# Patient Record
Sex: FEMALE | Race: WHITE | NOT HISPANIC OR LATINO | Employment: OTHER | URBAN - NONMETROPOLITAN AREA
[De-identification: names, ages, dates, MRNs, and addresses within clinical notes are randomized per-mention and may not be internally consistent; named-entity substitution may affect disease eponyms.]

---

## 2021-02-26 ENCOUNTER — OFFICE VISIT (OUTPATIENT)
Dept: URGENT CARE | Facility: PHYSICIAN GROUP | Age: 76
End: 2021-02-26
Payer: COMMERCIAL

## 2021-02-26 VITALS
TEMPERATURE: 98.5 F | OXYGEN SATURATION: 96 % | HEIGHT: 66 IN | BODY MASS INDEX: 24.2 KG/M2 | SYSTOLIC BLOOD PRESSURE: 136 MMHG | RESPIRATION RATE: 16 BRPM | HEART RATE: 75 BPM | DIASTOLIC BLOOD PRESSURE: 80 MMHG | WEIGHT: 150.6 LBS

## 2021-02-26 DIAGNOSIS — L08.9 WOUND INFECTION: ICD-10-CM

## 2021-02-26 DIAGNOSIS — T14.8XXA WOUND INFECTION: ICD-10-CM

## 2021-02-26 PROCEDURE — 99203 OFFICE O/P NEW LOW 30 MIN: CPT | Performed by: FAMILY MEDICINE

## 2021-02-26 RX ORDER — DORZOLAMIDE HYDROCHLORIDE AND TIMOLOL MALEATE 20; 5 MG/ML; MG/ML
SOLUTION/ DROPS OPHTHALMIC
COMMUNITY

## 2021-02-26 RX ORDER — SPIRONOLACTONE 25 MG/1
25 TABLET ORAL DAILY
COMMUNITY
Start: 2020-09-08

## 2021-02-26 RX ORDER — SULFAMETHOXAZOLE AND TRIMETHOPRIM 800; 160 MG/1; MG/1
1 TABLET ORAL 2 TIMES DAILY
Qty: 10 TABLET | Refills: 0 | Status: SHIPPED | OUTPATIENT
Start: 2021-02-26 | End: 2021-03-03

## 2021-02-26 RX ORDER — AMOXICILLIN 500 MG/1
500 CAPSULE ORAL 3 TIMES DAILY
Qty: 15 CAPSULE | Refills: 0 | Status: SHIPPED | OUTPATIENT
Start: 2021-02-26 | End: 2021-03-03

## 2021-02-26 RX ORDER — PRAVASTATIN SODIUM 10 MG
TABLET ORAL
COMMUNITY
Start: 2021-02-05 | End: 2023-08-06

## 2021-02-26 RX ORDER — MONTELUKAST SODIUM 10 MG/1
TABLET ORAL
COMMUNITY
Start: 2021-02-01

## 2021-02-26 ASSESSMENT — ENCOUNTER SYMPTOMS
EYE REDNESS: 0
MYALGIAS: 0
NAUSEA: 0
EYE DISCHARGE: 0
VOMITING: 0
WEIGHT LOSS: 0

## 2021-02-26 NOTE — PROGRESS NOTES
"Subjective:      Kelly Hurley is a 75 y.o. female who presents with Abrasion ((R) forearm abrasion from a week ago, possible infected )            1 week ago fell into a dresser and cut right forearm.  Over the last 2 days there has been expanding redness and warmth from the site.  Small amount of purulent drainage.  No red streaking.  No fever.  No PMH diabetes or immunocompromise.  No function or sensory loss.  No concern for foreign body.  No other aggravating or alleviating factors.      Review of Systems   Constitutional: Negative for malaise/fatigue and weight loss.   Eyes: Negative for discharge and redness.   Gastrointestinal: Negative for nausea and vomiting.   Musculoskeletal: Negative for joint pain and myalgias.   Skin: Negative for itching and rash.     .  Medications, Allergies, and current problem list reviewed today in Epic       Objective:     /80   Pulse 75   Temp 36.9 °C (98.5 °F)   Resp 16   Ht 1.676 m (5' 6\")   Wt 68.3 kg (150 lb 9.6 oz)   SpO2 96%   BMI 24.31 kg/m²      Physical Exam  Constitutional:       General: She is not in acute distress.     Appearance: She is well-developed.   HENT:      Head: Normocephalic and atraumatic.   Eyes:      Conjunctiva/sclera: Conjunctivae normal.   Cardiovascular:      Rate and Rhythm: Normal rate and regular rhythm.      Heart sounds: Normal heart sounds. No murmur.   Pulmonary:      Effort: Pulmonary effort is normal.      Breath sounds: Normal breath sounds. No wheezing.   Musculoskeletal:        Arms:    Skin:     General: Skin is warm and dry.      Findings: No rash.   Neurological:      Mental Status: She is alert and oriented to person, place, and time.                 Assessment/Plan:         1. Wound infection  amoxicillin (AMOXIL) 500 MG Cap    sulfamethoxazole-trimethoprim (BACTRIM DS) 800-160 MG tablet     Differential diagnosis, natural history, supportive care, and indications for immediate follow-up discussed at length.     Wound " care discussed.

## 2021-03-05 ENCOUNTER — TELEPHONE (OUTPATIENT)
Dept: URGENT CARE | Facility: PHYSICIAN GROUP | Age: 76
End: 2021-03-05

## 2021-03-05 NOTE — TELEPHONE ENCOUNTER
pts daughter called states that they finished the sulfamethoxazole-trimethoprim (BACTRIM DS) and they wanted to know if they are supposed to  the amoxicillin (AMOXIL) and if she needs to take that one too  Please advise

## 2021-03-06 NOTE — TELEPHONE ENCOUNTER
Called and discussed with Tiffany and her daughter Orly.  There continues to be signs of infection.  They will  the amoxicillin and let me know if there is no resolution.

## 2021-05-28 ENCOUNTER — OFFICE VISIT (OUTPATIENT)
Dept: URGENT CARE | Facility: PHYSICIAN GROUP | Age: 76
End: 2021-05-28
Payer: COMMERCIAL

## 2021-05-28 ENCOUNTER — HOSPITAL ENCOUNTER (OUTPATIENT)
Facility: MEDICAL CENTER | Age: 76
End: 2021-05-28
Attending: NURSE PRACTITIONER
Payer: COMMERCIAL

## 2021-05-28 VITALS
HEIGHT: 65 IN | HEART RATE: 77 BPM | RESPIRATION RATE: 12 BRPM | BODY MASS INDEX: 23.82 KG/M2 | SYSTOLIC BLOOD PRESSURE: 140 MMHG | WEIGHT: 143 LBS | OXYGEN SATURATION: 98 % | TEMPERATURE: 97.1 F | DIASTOLIC BLOOD PRESSURE: 90 MMHG

## 2021-05-28 DIAGNOSIS — R30.0 DYSURIA: ICD-10-CM

## 2021-05-28 LAB
APPEARANCE UR: CLEAR
BILIRUB UR STRIP-MCNC: NEGATIVE MG/DL
COLOR UR AUTO: YELLOW
GLUCOSE UR STRIP.AUTO-MCNC: NEGATIVE MG/DL
KETONES UR STRIP.AUTO-MCNC: NORMAL MG/DL
LEUKOCYTE ESTERASE UR QL STRIP.AUTO: NORMAL
NITRITE UR QL STRIP.AUTO: NEGATIVE
PH UR STRIP.AUTO: 7 [PH] (ref 5–8)
PROT UR QL STRIP: NEGATIVE MG/DL
RBC UR QL AUTO: NORMAL
SP GR UR STRIP.AUTO: 1.02
UROBILINOGEN UR STRIP-MCNC: NEGATIVE MG/DL

## 2021-05-28 PROCEDURE — 87660 TRICHOMONAS VAGIN DIR PROBE: CPT

## 2021-05-28 PROCEDURE — 87480 CANDIDA DNA DIR PROBE: CPT

## 2021-05-28 PROCEDURE — 87186 SC STD MICRODIL/AGAR DIL: CPT

## 2021-05-28 PROCEDURE — 87510 GARDNER VAG DNA DIR PROBE: CPT

## 2021-05-28 PROCEDURE — 87086 URINE CULTURE/COLONY COUNT: CPT

## 2021-05-28 PROCEDURE — 81002 URINALYSIS NONAUTO W/O SCOPE: CPT | Performed by: NURSE PRACTITIONER

## 2021-05-28 PROCEDURE — 99214 OFFICE O/P EST MOD 30 MIN: CPT | Performed by: NURSE PRACTITIONER

## 2021-05-28 PROCEDURE — 87077 CULTURE AEROBIC IDENTIFY: CPT

## 2021-05-28 RX ORDER — FLUCONAZOLE 150 MG/1
150 TABLET ORAL ONCE
Qty: 1 TABLET | Refills: 0 | Status: SHIPPED | OUTPATIENT
Start: 2021-05-28 | End: 2021-05-28

## 2021-05-28 RX ORDER — CLOBETASOL PROPIONATE 0.5 MG/G
CREAM TOPICAL
COMMUNITY
Start: 2021-03-18

## 2021-05-28 RX ORDER — LANSOPRAZOLE 30 MG/1
CAPSULE, DELAYED RELEASE ORAL
COMMUNITY
Start: 2021-05-05 | End: 2021-05-28

## 2021-05-28 RX ORDER — TRAZODONE HYDROCHLORIDE 50 MG/1
TABLET ORAL
COMMUNITY
Start: 2021-04-16

## 2021-05-28 RX ORDER — CITALOPRAM 40 MG/1
TABLET ORAL
COMMUNITY
Start: 2021-03-16

## 2021-05-28 RX ORDER — LANSOPRAZOLE 30 MG/1
CAPSULE, DELAYED RELEASE ORAL
COMMUNITY
Start: 2021-02-26

## 2021-05-28 RX ORDER — CEFDINIR 300 MG/1
300 CAPSULE ORAL 2 TIMES DAILY
Qty: 10 CAPSULE | Refills: 0 | Status: SHIPPED | OUTPATIENT
Start: 2021-05-28 | End: 2021-06-02

## 2021-05-28 RX ORDER — LATANOPROST 50 UG/ML
SOLUTION/ DROPS OPHTHALMIC
COMMUNITY
Start: 2021-05-02

## 2021-05-28 RX ORDER — ALBUTEROL SULFATE 90 UG/1
AEROSOL, METERED RESPIRATORY (INHALATION)
COMMUNITY
Start: 2021-03-16

## 2021-05-28 ASSESSMENT — ENCOUNTER SYMPTOMS
HEADACHES: 0
NAUSEA: 0
FLANK PAIN: 0
VOMITING: 0
MYALGIAS: 0

## 2021-05-28 NOTE — PROGRESS NOTES
Subjective:      Kelly Hurley is a 75 y.o. female who presents with No chief complaint on file.    History reviewed. No pertinent past medical history.  Social History     Socioeconomic History   • Marital status:      Spouse name: Not on file   • Number of children: Not on file   • Years of education: Not on file   • Highest education level: Not on file   Occupational History   • Not on file   Tobacco Use   • Smoking status: Never Smoker   • Smokeless tobacco: Never Used   Substance and Sexual Activity   • Alcohol use: Not Currently   • Drug use: Not Currently   • Sexual activity: Not on file   Other Topics Concern   • Not on file   Social History Narrative   • Not on file     Social Determinants of Health     Financial Resource Strain:    • Difficulty of Paying Living Expenses:    Food Insecurity:    • Worried About Running Out of Food in the Last Year:    • Ran Out of Food in the Last Year:    Transportation Needs:    • Lack of Transportation (Medical):    • Lack of Transportation (Non-Medical):    Physical Activity:    • Days of Exercise per Week:    • Minutes of Exercise per Session:    Stress:    • Feeling of Stress :    Social Connections:    • Frequency of Communication with Friends and Family:    • Frequency of Social Gatherings with Friends and Family:    • Attends Muslim Services:    • Active Member of Clubs or Organizations:    • Attends Club or Organization Meetings:    • Marital Status:    Intimate Partner Violence:    • Fear of Current or Ex-Partner:    • Emotionally Abused:    • Physically Abused:    • Sexually Abused:      History reviewed. No pertinent family history.    Allergies: Banana and Eggs    Patient is a 75-year-old female who presents with complaint of dysuria and vaginal itching.  Patient has used Monistat over-the-counter multiple times that she believes she had a yeast infection.  She states minimal improvement but then symptoms seem to return.          Dysuria   This is a  "new problem. The current episode started in the past 7 days. The problem occurs every urination. The problem has been unchanged. The pain is moderate. There has been no fever. Associated symptoms include frequency and urgency. Pertinent negatives include no flank pain, hematuria, nausea or vomiting. She has tried nothing for the symptoms. The treatment provided no relief.       Review of Systems   Gastrointestinal: Negative for nausea and vomiting.   Genitourinary: Positive for dysuria, frequency and urgency. Negative for flank pain and hematuria.   Musculoskeletal: Negative for myalgias.   Neurological: Negative for headaches.   All other systems reviewed and are negative.         Objective:     /90   Pulse 77   Temp 36.2 °C (97.1 °F) (Temporal)   Resp 12   Ht 1.651 m (5' 5\")   Wt 64.9 kg (143 lb)   SpO2 98%   BMI 23.80 kg/m²      Physical Exam  Vitals reviewed.   Constitutional:       Appearance: Normal appearance.   Abdominal:      General: Abdomen is flat.      Tenderness: There is no abdominal tenderness. There is no right CVA tenderness, left CVA tenderness, guarding or rebound.   Skin:     General: Skin is warm and dry.   Neurological:      Mental Status: She is alert.                   UA: trace leukocytes, negative nitrates, trace blood     Assessment/Plan:    dysuria  Cystitis    omnicef  Urine culture  Vaginal pathogens  Push fluids  ER precautions: flank pain, fever >100.5, n/v, flu like symptoms.        There are no diagnoses linked to this encounter.  "

## 2021-05-29 ENCOUNTER — TELEPHONE (OUTPATIENT)
Dept: URGENT CARE | Facility: PHYSICIAN GROUP | Age: 76
End: 2021-05-29

## 2021-05-29 LAB
CANDIDA DNA VAG QL PROBE+SIG AMP: NEGATIVE
G VAGINALIS DNA VAG QL PROBE+SIG AMP: NEGATIVE
T VAGINALIS DNA VAG QL PROBE+SIG AMP: NEGATIVE

## 2021-05-29 NOTE — TELEPHONE ENCOUNTER
Patient notified by phone of results of vaginal pathogens.  Panel is negative at this time.  Advised patient that her urine culture is still pending, and continue medication prescribed.  We will notify her upon receiving results.  Patient verbalized understanding and agreed with plan of care.

## 2021-05-31 LAB
BACTERIA UR CULT: ABNORMAL
BACTERIA UR CULT: ABNORMAL
SIGNIFICANT IND 70042: ABNORMAL
SITE SITE: ABNORMAL
SOURCE SOURCE: ABNORMAL

## 2021-06-01 ENCOUNTER — TELEPHONE (OUTPATIENT)
Dept: URGENT CARE | Facility: CLINIC | Age: 76
End: 2021-06-01

## 2021-06-01 DIAGNOSIS — N30.90 CYSTITIS: ICD-10-CM

## 2021-06-01 RX ORDER — SULFAMETHOXAZOLE AND TRIMETHOPRIM 800; 160 MG/1; MG/1
1 TABLET ORAL 2 TIMES DAILY
Qty: 10 TABLET | Refills: 0 | Status: SHIPPED | OUTPATIENT
Start: 2021-06-01 | End: 2021-06-06

## 2021-06-01 NOTE — TELEPHONE ENCOUNTER
Patient notified by phone of results of urine culture.  Urine is positive for MRSA, unfortunately resistant to the medication that was prescribed at her initial office visit.  Patient does not have any antibiotic allergies that she is aware of.  Prescription for Bactrim DS sent to patient's pharmacy.  I have advised patient to  the medication today and start taking.  Patient verbalized understanding and agreement.  Patient states she is still having symptoms although a slight improvement his symptoms have not completely resolved.  I have advised her the symptoms should completely resolve with the Bactrim.  If not, please come back to urgent care for reevaluation.  No further questions at this time.

## 2022-01-27 ENCOUNTER — TELEPHONE (OUTPATIENT)
Dept: CARDIOLOGY | Facility: MEDICAL CENTER | Age: 77
End: 2022-01-27

## 2022-01-27 NOTE — TELEPHONE ENCOUNTER
LVM for pt in regards to upcoming NP appt with [facility]. [facility] notes are in chart under media including EKG that was done at referring providers office. Need to confirm if this will be first time pt has seen cardio and if any other recent testing has been completed.    Pending call back.

## 2022-01-27 NOTE — TELEPHONE ENCOUNTER
Pt called back stating she has not seen a cardiologist before. Pt had a NM stress test that was done in [facility] in [place] about 4 years ago. Pt also had a treadmill stress test done 15 years ago in [facility] at a Dr's office but cannot remember the Dr's name.

## 2022-02-01 ENCOUNTER — OFFICE VISIT (OUTPATIENT)
Dept: CARDIOLOGY | Facility: MEDICAL CENTER | Age: 77
End: 2022-02-01
Payer: COMMERCIAL

## 2022-02-01 VITALS
SYSTOLIC BLOOD PRESSURE: 122 MMHG | BODY MASS INDEX: 23.72 KG/M2 | HEIGHT: 65 IN | RESPIRATION RATE: 16 BRPM | WEIGHT: 142.4 LBS | OXYGEN SATURATION: 94 % | HEART RATE: 93 BPM | DIASTOLIC BLOOD PRESSURE: 76 MMHG

## 2022-02-01 DIAGNOSIS — I10 ESSENTIAL HYPERTENSION: ICD-10-CM

## 2022-02-01 DIAGNOSIS — R07.89 OTHER CHEST PAIN: ICD-10-CM

## 2022-02-01 DIAGNOSIS — E78.5 DYSLIPIDEMIA: ICD-10-CM

## 2022-02-01 LAB — EKG IMPRESSION: NORMAL

## 2022-02-01 PROCEDURE — 99214 OFFICE O/P EST MOD 30 MIN: CPT | Mod: 25 | Performed by: INTERNAL MEDICINE

## 2022-02-01 PROCEDURE — 93000 ELECTROCARDIOGRAM COMPLETE: CPT | Performed by: INTERNAL MEDICINE

## 2022-02-01 RX ORDER — ASPIRIN 81 MG/1
81 TABLET, CHEWABLE ORAL
COMMUNITY

## 2022-02-01 RX ORDER — METOPROLOL SUCCINATE 25 MG/1
TABLET, EXTENDED RELEASE ORAL
COMMUNITY
Start: 2022-01-07 | End: 2022-02-01

## 2022-02-01 ASSESSMENT — ENCOUNTER SYMPTOMS
LIGHT-HEADEDNESS: 0
MYALGIAS: 0
DIZZINESS: 0
DOUBLE VISION: 0
ORTHOPNEA: 0
COUGH: 1
DIAPHORESIS: 0
WHEEZING: 0
VOMITING: 0
PND: 0
SYNCOPE: 0
SORE THROAT: 0
SLEEP DISTURBANCES DUE TO BREATHING: 0
FALLS: 0
DIARRHEA: 0
NIGHT SWEATS: 0
SHORTNESS OF BREATH: 1
BLURRED VISION: 0
BLOATING: 0
BACK PAIN: 1
PARESTHESIAS: 0
IRREGULAR HEARTBEAT: 0
DYSPNEA ON EXERTION: 0
HEADACHES: 0
DECREASED APPETITE: 0
FEVER: 0
FLANK PAIN: 0
WEAKNESS: 1
EXCESSIVE DAYTIME SLEEPINESS: 0
NAUSEA: 0
LOSS OF BALANCE: 0
CONSTIPATION: 0
NUMBNESS: 0
PALPITATIONS: 1
NEAR-SYNCOPE: 0

## 2022-02-01 NOTE — PROGRESS NOTES
Cardiology Initial Consultation Note    Date of note:    2/1/2022    Primary Care Provider: Cecilia Lyman M.D.  Referring Provider: Cecilia Lyman M.D.    Patient Name: Kelly Hurley     YOB: 1945  MRN:              [id number]    Chief Complaint   Patient presents with   • Chest Pain     N/P Dx: Chest pain, unspecified       History of Present Illness: Ms. Kelly Hurley is a 76 y.o. female whose current medical problems include HTN and HLD who is here for cardiac consultation for chest pain.    Patient states that several years ago she had an episode with chest pain radiating to the back.  Went to a hospital in [place] and underwent cardiac workup with stress test with no evidence of CAD.  About 1-2 weeks ago, had another episode of bilateral shoulder pain with radiation to the arms and chest pain.  Went to [facility], had cardiac workup which was unremarkable.  No episodes since then.  Episodes can happen at rest or with stress.    In terms of physical activity, is active, walks with her dogs daily and is active around the house with no concerning cardiovascular symptoms.    Cardiovascular Risk Factors:  1. Smoking status: Never smoker  2. Type II Diabetes Mellitus: No   3. Hypertension: Yes  4. Dyslipidemia: Yes  5. Family history of early Coronary Artery Disease in a first degree relative (Male less than 55 years of age; Female less than 65 years of age): Mother had heart attack in her 40s, half-brother had heart attack in his 50s, half-sister had heart attack in her 40s   6.  Obesity and/or Metabolic Syndrome: No  7. Sedentary lifestyle: No    Review of Systems   Constitutional: Positive for malaise/fatigue. Negative for decreased appetite, diaphoresis, fever and night sweats.   HENT: Negative for congestion and sore throat.    Eyes: Negative for blurred vision and double vision.   Cardiovascular: Positive for chest pain and palpitations. Negative for cyanosis, dyspnea on  exertion, irregular heartbeat, leg swelling, near-syncope, orthopnea, paroxysmal nocturnal dyspnea and syncope.   Respiratory: Positive for cough and shortness of breath. Negative for sleep disturbances due to breathing and wheezing.    Endocrine: Negative for cold intolerance and heat intolerance.   Musculoskeletal: Positive for back pain. Negative for falls and myalgias.   Gastrointestinal: Negative for bloating, constipation, diarrhea, nausea and vomiting.   Genitourinary: Positive for frequency and urgency. Negative for dysuria and flank pain.   Neurological: Positive for weakness. Negative for excessive daytime sleepiness, dizziness, headaches, light-headedness, loss of balance, numbness and paresthesias.       History reviewed. No pertinent past medical history.      History reviewed. No pertinent surgical history.      Current Outpatient Medications   Medication Sig Dispense Refill   • Multiple Vitamins-Minerals (PRESERVISION/LUTEIN PO) Take  by mouth.     • aspirin (ASA) 81 MG Chew Tab chewable tablet Chew 81 mg.     • Multiple Vitamins-Minerals (CENTRUM SILVER ADULT 50+ PO) Take  by mouth every day.     • albuterol 108 (90 Base) MCG/ACT Aero Soln inhalation aerosol      • clobetasol (TEMOVATE) 0.05 % Cream APPLY EXTERNALLY TO THE AFFECTED AREA TWICE DAILY     • lansoprazole (PREVACID) 30 MG CAPSULE DELAYED RELEASE TAKE 1 CAPSULE DAILY 30    MINUTES BEFORE BREAKFAST     • latanoprost (XALATAN) 0.005 % Solution      • citalopram (CELEXA) 40 MG Tab      • traZODone (DESYREL) 50 MG Tab      • spironolactone (ALDACTONE) 25 MG Tab Take 25 mg by mouth every day.     • pravastatin (PRAVACHOL) 10 MG Tab TAKE 1 TABLET BY MOUTH EVERY DAY     • montelukast (SINGULAIR) 10 MG Tab TAKE 1 TABLET EVERY EVENING     • dorzolamide-timolol (COSOPT) 22.3-6.8 MG/ML Solution Administer 1 drop into both eyes 2 (two) times a day       No current facility-administered medications for this visit.         Allergies   Allergen Reactions    • Mixed Grasses Hives, Itching and Cough   • Banana      eczema   • Eggs      eczema         Family History   Problem Relation Age of Onset   • Heart Attack Mother    • Heart Attack Sister    • Heart Attack Brother          Social History     Socioeconomic History   • Marital status:      Spouse name: Not on file   • Number of children: Not on file   • Years of education: Not on file   • Highest education level: Not on file   Occupational History   • Not on file   Tobacco Use   • Smoking status: Never Smoker   • Smokeless tobacco: Never Used   Substance and Sexual Activity   • Alcohol use: Yes     Alcohol/week: 6.0 oz     Types: 10 Glasses of wine per week   • Drug use: Never   • Sexual activity: Not on file   Other Topics Concern   • Not on file   Social History Narrative   • Not on file     Social Determinants of Health     Financial Resource Strain:    • Difficulty of Paying Living Expenses: Not on file   Food Insecurity:    • Worried About Running Out of Food in the Last Year: Not on file   • Ran Out of Food in the Last Year: Not on file   Transportation Needs:    • Lack of Transportation (Medical): Not on file   • Lack of Transportation (Non-Medical): Not on file   Physical Activity:    • Days of Exercise per Week: Not on file   • Minutes of Exercise per Session: Not on file   Stress:    • Feeling of Stress : Not on file   Social Connections:    • Frequency of Communication with Friends and Family: Not on file   • Frequency of Social Gatherings with Friends and Family: Not on file   • Attends Uatsdin Services: Not on file   • Active Member of Clubs or Organizations: Not on file   • Attends Club or Organization Meetings: Not on file   • Marital Status: Not on file   Intimate Partner Violence:    • Fear of Current or Ex-Partner: Not on file   • Emotionally Abused: Not on file   • Physically Abused: Not on file   • Sexually Abused: Not on file   Housing Stability:    • Unable to Pay for Housing in the  "Last Year: Not on file   • Number of Places Lived in the Last Year: Not on file   • Unstable Housing in the Last Year: Not on file         Physical Exam:  Ambulatory Vitals  /76 (BP Location: Left arm, Patient Position: Sitting, BP Cuff Size: Adult)   Pulse 93   Resp 16   Ht 1.651 m (5' 5\")   Wt 64.6 kg (142 lb 6.4 oz)   SpO2 94%    Oxygen Therapy:  Pulse Oximetry: 94 %  BP Readings from Last 4 Encounters:   02/01/22 122/76   05/28/21 140/90   02/26/21 136/80       Weight/BMI: Body mass index is 23.7 kg/m².  Wt Readings from Last 4 Encounters:   02/01/22 64.6 kg (142 lb 6.4 oz)   05/28/21 64.9 kg (143 lb)   02/26/21 68.3 kg (150 lb 9.6 oz)       General: Well appearing and in no apparent distress  Eyes: nl conjunctiva, no icteric sclera  ENT: wearing a mask, normal external appearance of ears  Neck: no visible JVP,  no carotid bruits  Lungs: normal respiratory effort, CTAB  Heart: RRR, no murmurs, no rubs or gallops,  no edema bilateral lower extremities. No LV/RV heave on cardiac palpatation. 2+ bilateral radial pulses.  2+ bilateral dp pulses.   Abdomen: soft, non tender, non distended, no masses, normal bowel sounds.  No HSM.  Extremities/MSK: no clubbing, no cyanosis  Neurological: No focal sensory deficits  Psychiatric: Appropriate affect, A/O x 3, intact judgement and insight  Skin: Warm extremities      Outside Lab Data Review:  CBC: WBC 5.1, hemoglobin 12.9, hematocrit 30.5, platelets 241  Sodium 138, potassium 4.5, bicarb 27, BUN 18, creatinine 0.96  Total cholesterol 278, HDL 83, triglycerides 92,     Cardiac Imaging and Procedures Review:    EKG dated 2/1/2022: My personal interpretation is sinus rhythm    Outside Nuclear Perfusion Imaging (4/26/2016):   IMPRESSION:   Pharmacologic stress myocardial perfusion SPECT study shows no   significant evidence of myocardial ischemia or infarction study.   Perfusion: Normal perfusion with likely evidence of artifact due   to breast attenuation " and increased bowel uptake   Left ventricular systolic function: normal.   Left ventricular size: normal.   Wall motion: were no regional wall motion abnormalities       Assessment & Plan     1. Other chest pain  NM-CARDIAC STRESS TEST    EKG   2. Essential hypertension  NM-CARDIAC STRESS TEST   3. Dyslipidemia           Shared Medical Decision Making:  Patient symptoms of chest pain are atypical with some typical features.  Had stress test done in 2016 with no evidence of ischemia or infarction.  After discussion, obtain pharmacological nuclear cardiac stress test to rule obstructive CAD contributing to her symptoms which are new and different from prior.  Does have significant family history of premature CAD.    Blood pressure well controlled.  Continue spironolactone 25 mg daily.    Patient had updated lipid panel with her primary care.  Obtain records of most recent lab work.  Continue pravastatin 10 mg for now.      All of patient and her daughter's excellent questions were answered to the best of my knowledge and to their satisfaction.  It was a pleasure seeing Ms. Kelly Hurley in my clinic today. Return in about 1 year (around 2/1/2023). or earlier if abnormal test results.  Patient is aware to call the cardiology clinic with any questions or concerns.      Khanh Stanton MD  [facility]  [facility], [address]  [address] [address]  Phone: [phone]  Fax: [phone]    Please note that this dictation was created using voice recognition software. I have made every reasonable attempt to correct obvious errors, but it is possible there are errors of grammar and possibly content that I did not discover before finalizing the note.

## 2022-02-10 ENCOUNTER — HOSPITAL ENCOUNTER (OUTPATIENT)
Dept: RADIOLOGY | Facility: MEDICAL CENTER | Age: 77
End: 2022-02-10
Attending: INTERNAL MEDICINE
Payer: COMMERCIAL

## 2022-02-10 DIAGNOSIS — R07.89 OTHER CHEST PAIN: ICD-10-CM

## 2022-02-10 DIAGNOSIS — I10 ESSENTIAL HYPERTENSION: ICD-10-CM

## 2022-02-10 PROCEDURE — 78452 HT MUSCLE IMAGE SPECT MULT: CPT | Mod: 26 | Performed by: INTERNAL MEDICINE

## 2022-02-10 PROCEDURE — 700111 HCHG RX REV CODE 636 W/ 250 OVERRIDE (IP)

## 2022-02-10 PROCEDURE — 93018 CV STRESS TEST I&R ONLY: CPT | Performed by: INTERNAL MEDICINE

## 2022-02-10 PROCEDURE — A9502 TC99M TETROFOSMIN: HCPCS

## 2022-02-10 RX ORDER — AMINOPHYLLINE 25 MG/ML
100 INJECTION, SOLUTION INTRAVENOUS
Status: DISCONTINUED | OUTPATIENT
Start: 2022-02-10 | End: 2022-02-11 | Stop reason: HOSPADM

## 2022-02-10 RX ORDER — REGADENOSON 0.08 MG/ML
INJECTION, SOLUTION INTRAVENOUS
Status: COMPLETED
Start: 2022-02-10 | End: 2022-02-10

## 2022-02-10 RX ORDER — REGADENOSON 0.08 MG/ML
0.4 INJECTION, SOLUTION INTRAVENOUS ONCE
Status: COMPLETED | OUTPATIENT
Start: 2022-02-10 | End: 2022-02-10

## 2022-02-10 RX ADMIN — REGADENOSON 0.4 MG: 0.08 INJECTION, SOLUTION INTRAVENOUS at 11:25

## 2023-08-06 ENCOUNTER — OFFICE VISIT (OUTPATIENT)
Dept: URGENT CARE | Facility: PHYSICIAN GROUP | Age: 78
End: 2023-08-06
Payer: COMMERCIAL

## 2023-08-06 ENCOUNTER — HOSPITAL ENCOUNTER (OUTPATIENT)
Facility: MEDICAL CENTER | Age: 78
End: 2023-08-06
Attending: NURSE PRACTITIONER
Payer: COMMERCIAL

## 2023-08-06 VITALS
BODY MASS INDEX: 24.27 KG/M2 | WEIGHT: 137 LBS | HEIGHT: 63 IN | DIASTOLIC BLOOD PRESSURE: 74 MMHG | SYSTOLIC BLOOD PRESSURE: 108 MMHG | RESPIRATION RATE: 16 BRPM | OXYGEN SATURATION: 96 % | HEART RATE: 93 BPM | TEMPERATURE: 97.8 F

## 2023-08-06 DIAGNOSIS — N30.01 ACUTE CYSTITIS WITH HEMATURIA: ICD-10-CM

## 2023-08-06 DIAGNOSIS — R39.15 URINARY URGENCY: ICD-10-CM

## 2023-08-06 DIAGNOSIS — R35.0 URINARY FREQUENCY: ICD-10-CM

## 2023-08-06 LAB
APPEARANCE UR: CLEAR
BILIRUB UR STRIP-MCNC: NEGATIVE MG/DL
COLOR UR AUTO: NORMAL
GLUCOSE UR STRIP.AUTO-MCNC: 100 MG/DL
KETONES UR STRIP.AUTO-MCNC: NORMAL MG/DL
LEUKOCYTE ESTERASE UR QL STRIP.AUTO: NORMAL
NITRITE UR QL STRIP.AUTO: POSITIVE
PH UR STRIP.AUTO: 6 [PH] (ref 5–8)
PROT UR QL STRIP: 30 MG/DL
RBC UR QL AUTO: NORMAL
SP GR UR STRIP.AUTO: 1.01
UROBILINOGEN UR STRIP-MCNC: 1 MG/DL

## 2023-08-06 PROCEDURE — 81002 URINALYSIS NONAUTO W/O SCOPE: CPT | Performed by: NURSE PRACTITIONER

## 2023-08-06 PROCEDURE — 3078F DIAST BP <80 MM HG: CPT | Performed by: NURSE PRACTITIONER

## 2023-08-06 PROCEDURE — 87086 URINE CULTURE/COLONY COUNT: CPT

## 2023-08-06 PROCEDURE — 3074F SYST BP LT 130 MM HG: CPT | Performed by: NURSE PRACTITIONER

## 2023-08-06 PROCEDURE — 99214 OFFICE O/P EST MOD 30 MIN: CPT | Performed by: NURSE PRACTITIONER

## 2023-08-06 RX ORDER — CEFDINIR 300 MG/1
300 CAPSULE ORAL EVERY 12 HOURS
Qty: 10 CAPSULE | Refills: 0 | Status: SHIPPED | OUTPATIENT
Start: 2023-08-06 | End: 2023-08-11

## 2023-08-06 RX ORDER — PHENAZOPYRIDINE HYDROCHLORIDE 200 MG/1
200 TABLET, FILM COATED ORAL 3 TIMES DAILY
Qty: 6 TABLET | Refills: 0 | Status: SHIPPED | OUTPATIENT
Start: 2023-08-06 | End: 2023-08-08

## 2023-08-06 NOTE — PROGRESS NOTES
Subjective:   Kelly Hurley is a pleasant 77 y.o. female who presents for UTI (X 1 month with pain with urination, urgency and frequency.  Pt was treated with Bactrim and ended on the medication on Wednesday. She said Bactrim gave her a rash. )       HPI  Patient presents for evaluation of 3-day history of urinary frequency, hesitancy, and pain.  Patient was recently seen by her primary care in [place], who prescribed 5 days of Bactrim, which patient took as prescribed.  Patient reports she was initially feeling better, however 3 days ago she noticed a return of his symptoms.  Denies fever, chills, flank pain.  Denies known history of drug-resistant urinary organisms.  Does have a history of MRSA.    ROS  All other systems are negative except as documented above within HPI.    MEDS:   Current Outpatient Medications:     aspirin (ASA) 81 MG Chew Tab chewable tablet, Chew 81 mg., Disp: , Rfl:     Multiple Vitamins-Minerals (CENTRUM SILVER ADULT 50+ PO), Take  by mouth every day., Disp: , Rfl:     albuterol 108 (90 Base) MCG/ACT Aero Soln inhalation aerosol, , Disp: , Rfl:     clobetasol (TEMOVATE) 0.05 % Cream, APPLY EXTERNALLY TO THE AFFECTED AREA TWICE DAILY, Disp: , Rfl:     lansoprazole (PREVACID) 30 MG CAPSULE DELAYED RELEASE, TAKE 1 CAPSULE DAILY 30    MINUTES BEFORE BREAKFAST, Disp: , Rfl:     latanoprost (XALATAN) 0.005 % Solution, , Disp: , Rfl:     citalopram (CELEXA) 40 MG Tab, , Disp: , Rfl:     traZODone (DESYREL) 50 MG Tab, , Disp: , Rfl:     spironolactone (ALDACTONE) 25 MG Tab, Take 25 mg by mouth every day., Disp: , Rfl:     montelukast (SINGULAIR) 10 MG Tab, TAKE 1 TABLET EVERY EVENING, Disp: , Rfl:     dorzolamide-timolol (COSOPT) 22.3-6.8 MG/ML Solution, Administer 1 drop into both eyes 2 (two) times a day, Disp: , Rfl:     Multiple Vitamins-Minerals (PRESERVISION/LUTEIN PO), Take  by mouth., Disp: , Rfl:     pravastatin (PRAVACHOL) 10 MG Tab, TAKE 1 TABLET BY MOUTH EVERY DAY, Disp: , Rfl:  "  ALLERGIES:   Allergies   Allergen Reactions    Mixed Grasses Hives, Itching and Cough    Banana      eczema    Eggs      eczema       Patient's PMH, SocHx, SurgHx, FamHx, Drug allergies and medications were reviewed.     Objective:   /74   Pulse 93   Temp 36.6 °C (97.8 °F) (Temporal)   Resp 16   Ht 1.6 m (5' 3\")   Wt 62.1 kg (137 lb)   SpO2 96%   BMI 24.27 kg/m²     Physical Exam  Vitals and nursing note reviewed.   Constitutional:       General: She is awake.      Appearance: Normal appearance. She is well-developed.   HENT:      Head: Normocephalic and atraumatic.      Right Ear: External ear normal.      Left Ear: External ear normal.      Nose: Nose normal.      Mouth/Throat:      Mouth: Mucous membranes are moist.      Pharynx: Oropharynx is clear.   Eyes:      Extraocular Movements: Extraocular movements intact.      Conjunctiva/sclera: Conjunctivae normal.   Cardiovascular:      Rate and Rhythm: Normal rate and regular rhythm.      Heart sounds: Normal heart sounds.   Pulmonary:      Effort: Pulmonary effort is normal.      Breath sounds: Normal breath sounds.   Abdominal:      General: Bowel sounds are normal.      Palpations: Abdomen is soft.      Tenderness: There is abdominal tenderness in the suprapubic area. There is no right CVA tenderness or left CVA tenderness.   Musculoskeletal:         General: Normal range of motion.      Cervical back: Normal range of motion and neck supple.   Skin:     General: Skin is warm and dry.   Neurological:      General: No focal deficit present.      Mental Status: She is alert and oriented to person, place, and time.   Psychiatric:         Mood and Affect: Mood normal.         Behavior: Behavior normal. Behavior is cooperative.         Thought Content: Thought content normal.         Judgment: Judgment normal.         Assessment/Plan:   Assessment    1. Acute cystitis with hematuria  - POCT Urinalysis  - Urine Culture; Future  - cefdinir (OMNICEF) 300 MG " Cap; Take 1 Capsule by mouth every 12 hours for 5 days.  Dispense: 10 Capsule; Refill: 0  - phenazopyridine (PYRIDIUM) 200 MG Tab; Take 1 Tablet by mouth 3 times a day for 2 days.  Dispense: 6 Tablet; Refill: 0    2. Urinary frequency  - POCT Urinalysis  - Urine Culture; Future    3. Urinary urgency  - POCT Urinalysis  - Urine Culture; Future      Vital signs stable at today's acute urgent care visit.  Reviewed test results that were completed in the clinic.  Send urine for culture.  Begin antibiotics as listed.  Recommend begin cranberry tablets as well as pushing fluids. Discussed management options (risks, benefits, and alternatives to treatment).     Advised the patient to follow-up with the primary care provider for recheck, reevaluation, and/or consideration of further management if necessary. Return to urgent care with any worsening symptoms or if there is no improvement in their current condition. Red flags discussed and indications to immediately call 911 or present to the Emergency Department.  All questions were encouraged and answered to the patient's satisfaction and understanding, and they agree to the plan of care.     I personally reviewed prior external notes and test results pertinent to today's visit.  I have independently reviewed and interpreted all diagnostics ordered during this urgent care acute visit. Time spent evaluating this patient includes preparing for visit, counseling/education, exam and evaluation, obtaining history, independent interpretation and ordering lab/test/procedures.      Please note that this dictation was created using voice recognition software. I have made a reasonable attempt to correct obvious errors, but I expect that there are errors of grammar and possibly content that I did not discover before finalizing the note.

## 2023-08-07 DIAGNOSIS — R35.0 URINARY FREQUENCY: ICD-10-CM

## 2023-08-07 DIAGNOSIS — R39.15 URINARY URGENCY: ICD-10-CM

## 2023-08-07 DIAGNOSIS — N30.01 ACUTE CYSTITIS WITH HEMATURIA: ICD-10-CM

## 2023-08-09 LAB
BACTERIA UR CULT: NORMAL
SIGNIFICANT IND 70042: NORMAL
SITE SITE: NORMAL
SOURCE SOURCE: NORMAL

## 2023-08-25 ENCOUNTER — TELEPHONE (OUTPATIENT)
Dept: CARDIOLOGY | Facility: MEDICAL CENTER | Age: 78
End: 2023-08-25
Payer: COMMERCIAL

## 2023-08-28 ENCOUNTER — HOSPITAL ENCOUNTER (OUTPATIENT)
Facility: MEDICAL CENTER | Age: 78
End: 2023-08-28
Attending: NURSE PRACTITIONER
Payer: COMMERCIAL

## 2023-08-28 ENCOUNTER — OFFICE VISIT (OUTPATIENT)
Dept: URGENT CARE | Facility: PHYSICIAN GROUP | Age: 78
End: 2023-08-28
Payer: COMMERCIAL

## 2023-08-28 VITALS
HEIGHT: 63 IN | BODY MASS INDEX: 23.92 KG/M2 | RESPIRATION RATE: 18 BRPM | SYSTOLIC BLOOD PRESSURE: 124 MMHG | WEIGHT: 135 LBS | OXYGEN SATURATION: 97 % | TEMPERATURE: 97.8 F | HEART RATE: 69 BPM | DIASTOLIC BLOOD PRESSURE: 80 MMHG

## 2023-08-28 DIAGNOSIS — R35.0 URINARY FREQUENCY: ICD-10-CM

## 2023-08-28 DIAGNOSIS — R30.0 DYSURIA: ICD-10-CM

## 2023-08-28 DIAGNOSIS — N89.8 VAGINAL IRRITATION: ICD-10-CM

## 2023-08-28 PROBLEM — M54.2 NECK PAIN: Status: ACTIVE | Noted: 2022-05-05

## 2023-08-28 PROBLEM — M71.38 SYNOVIAL CYST OF LUMBAR SPINE: Status: ACTIVE | Noted: 2022-05-04

## 2023-08-28 PROBLEM — G96.191 PERINEURAL CYST: Status: ACTIVE | Noted: 2022-05-04

## 2023-08-28 PROBLEM — R26.89 IMPAIRMENT OF BALANCE: Status: ACTIVE | Noted: 2022-05-05

## 2023-08-28 LAB
APPEARANCE UR: CLEAR
BILIRUB UR STRIP-MCNC: NORMAL MG/DL
COLOR UR AUTO: YELLOW
GLUCOSE UR STRIP.AUTO-MCNC: NORMAL MG/DL
KETONES UR STRIP.AUTO-MCNC: NORMAL MG/DL
LEUKOCYTE ESTERASE UR QL STRIP.AUTO: NORMAL
NITRITE UR QL STRIP.AUTO: NORMAL
PH UR STRIP.AUTO: 5.5 [PH] (ref 5–8)
PROT UR QL STRIP: NORMAL MG/DL
RBC UR QL AUTO: NORMAL
SP GR UR STRIP.AUTO: <=1.005
UROBILINOGEN UR STRIP-MCNC: 0.2 MG/DL

## 2023-08-28 PROCEDURE — 87086 URINE CULTURE/COLONY COUNT: CPT

## 2023-08-28 PROCEDURE — 99213 OFFICE O/P EST LOW 20 MIN: CPT | Performed by: NURSE PRACTITIONER

## 2023-08-28 PROCEDURE — 87660 TRICHOMONAS VAGIN DIR PROBE: CPT

## 2023-08-28 PROCEDURE — 87480 CANDIDA DNA DIR PROBE: CPT

## 2023-08-28 PROCEDURE — 87510 GARDNER VAG DNA DIR PROBE: CPT

## 2023-08-28 PROCEDURE — 87186 SC STD MICRODIL/AGAR DIL: CPT

## 2023-08-28 PROCEDURE — 81002 URINALYSIS NONAUTO W/O SCOPE: CPT | Performed by: NURSE PRACTITIONER

## 2023-08-28 PROCEDURE — 3079F DIAST BP 80-89 MM HG: CPT | Performed by: NURSE PRACTITIONER

## 2023-08-28 PROCEDURE — 87077 CULTURE AEROBIC IDENTIFY: CPT

## 2023-08-28 PROCEDURE — 3074F SYST BP LT 130 MM HG: CPT | Performed by: NURSE PRACTITIONER

## 2023-08-28 ASSESSMENT — ENCOUNTER SYMPTOMS
ABDOMINAL PAIN: 0
CHILLS: 0
NAUSEA: 0
DIARRHEA: 0
FEVER: 0
VOMITING: 0
FLANK PAIN: 0

## 2023-08-29 DIAGNOSIS — R35.0 URINARY FREQUENCY: ICD-10-CM

## 2023-08-29 DIAGNOSIS — N89.8 VAGINAL IRRITATION: ICD-10-CM

## 2023-08-29 DIAGNOSIS — R30.0 DYSURIA: ICD-10-CM

## 2023-08-29 NOTE — PROGRESS NOTES
"Subjective:     Kelly Hurley is a 77 y.o. female who presents for Urinary Frequency and Painful Urination      Urinary Frequency  Pertinent negatives include no abdominal pain, chills, fever, nausea or vomiting.     Pt presents for evaluation of a new problem. Kelly is a very pleasant 77-year-old female presents to urgent care today with complaints of urinary frequency, dysuria, vaginal itching and burning that is been ongoing for the past few days.  She had similar symptoms 3 weeks ago and was started on cefdinir which she states did provide good relief of her symptoms.  However, culture was negative for bacterial infection.  She notes previously she did have an abnormal odor but this is no longer present.  There has been no vaginal discharge.  She denies any blood in her urine.  Negative for fever, chills, nausea or vomiting.    Review of Systems   Constitutional:  Negative for chills and fever.   Gastrointestinal:  Negative for abdominal pain, diarrhea, nausea and vomiting.   Genitourinary:  Positive for dysuria, frequency and urgency. Negative for flank pain and hematuria.       PMH: No past medical history on file.  ALLERGIES:   Allergies   Allergen Reactions    Mixed Grasses Hives, Itching and Cough    Banana      eczema    Eggs      eczema     SURGHX: No past surgical history on file.  SOCHX:   Social History     Socioeconomic History    Marital status:    Tobacco Use    Smoking status: Never    Smokeless tobacco: Never   Vaping Use    Vaping Use: Never used   Substance and Sexual Activity    Alcohol use: Yes     Alcohol/week: 6.0 oz     Types: 10 Glasses of wine per week    Drug use: Never     FH:   Family History   Problem Relation Age of Onset    Heart Attack Mother     Heart Attack Sister     Heart Attack Brother          Objective:   /80   Pulse 69   Temp 36.6 °C (97.8 °F) (Temporal)   Resp 18   Ht 1.6 m (5' 3\")   Wt 61.2 kg (135 lb)   SpO2 97%   BMI 23.91 kg/m²     Physical " Exam  Vitals and nursing note reviewed.   Constitutional:       General: She is not in acute distress.     Appearance: Normal appearance. She is not ill-appearing.   HENT:      Head: Normocephalic and atraumatic.      Right Ear: External ear normal.      Left Ear: External ear normal.      Nose: No congestion or rhinorrhea.      Mouth/Throat:      Mouth: Mucous membranes are moist.   Eyes:      Extraocular Movements: Extraocular movements intact.      Pupils: Pupils are equal, round, and reactive to light.   Cardiovascular:      Rate and Rhythm: Normal rate and regular rhythm.      Pulses: Normal pulses.      Heart sounds: Normal heart sounds.   Pulmonary:      Effort: Pulmonary effort is normal.      Breath sounds: Normal breath sounds.   Abdominal:      General: Abdomen is flat. Bowel sounds are normal.      Palpations: Abdomen is soft.      Tenderness: There is abdominal tenderness. There is no right CVA tenderness or left CVA tenderness.   Musculoskeletal:         General: Normal range of motion.      Cervical back: Normal range of motion and neck supple.   Skin:     General: Skin is warm and dry.      Capillary Refill: Capillary refill takes less than 2 seconds.   Neurological:      General: No focal deficit present.      Mental Status: She is alert and oriented to person, place, and time. Mental status is at baseline.   Psychiatric:         Mood and Affect: Mood normal.         Behavior: Behavior normal.         Thought Content: Thought content normal.         Judgment: Judgment normal.       Results for orders placed or performed in visit on 08/28/23   POCT Urinalysis   Result Value Ref Range    POC Color yellow Negative    POC Appearance clear Negative    POC Glucose neg Negative mg/dL    POC Bilirubin neg Negative mg/dL    POC Ketones neg Negative mg/dL    POC Specific Gravity <=1.005 <1.005 - >1.030    POC Blood trace-lysed Negative    POC Urine PH 5.5 5.0 - 8.0    POC Protein neg Negative mg/dL    POC  Urobiligen 0.2 Negative (0.2) mg/dL    POC Nitrites neg Negative    POC Leukocyte Esterase small Negative       Assessment/Plan:   Assessment      1. Vaginal irritation  VAGINAL PATHOGENS DNA PANEL    URINE CULTURE(NEW)      2. Dysuria  VAGINAL PATHOGENS DNA PANEL    URINE CULTURE(NEW)    POCT Urinalysis      3. Urinary frequency  VAGINAL PATHOGENS DNA PANEL    URINE CULTURE(NEW)    POCT Urinalysis        Urine is suspicious for UTI however, due to vaginal irritation and itching I am also concerned for BV versus vaginal candidiasis.  Vaginal pathogen culture collected in clinic today and sent to lab for further evaluation.  I will notify her of results.  We will hold off on treatment at this time as she notes her symptoms are tolerable.

## 2023-08-30 ENCOUNTER — TELEPHONE (OUTPATIENT)
Dept: URGENT CARE | Facility: PHYSICIAN GROUP | Age: 78
End: 2023-08-30
Payer: COMMERCIAL

## 2023-08-30 ENCOUNTER — OFFICE VISIT (OUTPATIENT)
Dept: URGENT CARE | Facility: PHYSICIAN GROUP | Age: 78
End: 2023-08-30
Payer: COMMERCIAL

## 2023-08-30 VITALS
WEIGHT: 135 LBS | DIASTOLIC BLOOD PRESSURE: 74 MMHG | HEART RATE: 101 BPM | HEIGHT: 63 IN | BODY MASS INDEX: 23.92 KG/M2 | TEMPERATURE: 98 F | OXYGEN SATURATION: 97 % | RESPIRATION RATE: 16 BRPM | SYSTOLIC BLOOD PRESSURE: 118 MMHG

## 2023-08-30 DIAGNOSIS — N30.01 ACUTE CYSTITIS WITH HEMATURIA: ICD-10-CM

## 2023-08-30 DIAGNOSIS — R30.0 DYSURIA: ICD-10-CM

## 2023-08-30 DIAGNOSIS — N89.8 VAGINAL IRRITATION: ICD-10-CM

## 2023-08-30 PROCEDURE — 3078F DIAST BP <80 MM HG: CPT | Performed by: NURSE PRACTITIONER

## 2023-08-30 PROCEDURE — 99213 OFFICE O/P EST LOW 20 MIN: CPT | Performed by: NURSE PRACTITIONER

## 2023-08-30 PROCEDURE — 3074F SYST BP LT 130 MM HG: CPT | Performed by: NURSE PRACTITIONER

## 2023-08-30 RX ORDER — PHENAZOPYRIDINE HYDROCHLORIDE 200 MG/1
200 TABLET, FILM COATED ORAL 3 TIMES DAILY
Qty: 6 TABLET | Refills: 0 | Status: SHIPPED | OUTPATIENT
Start: 2023-08-30 | End: 2023-09-01

## 2023-08-30 RX ORDER — ESTRADIOL 0.1 MG/G
0.5 CREAM VAGINAL DAILY
Qty: 42.5 G | Refills: 1 | Status: SHIPPED | OUTPATIENT
Start: 2023-08-30 | End: 2023-09-29

## 2023-08-31 RX ORDER — SULFAMETHOXAZOLE AND TRIMETHOPRIM 800; 160 MG/1; MG/1
1 TABLET ORAL 2 TIMES DAILY
Qty: 14 TABLET | Refills: 0 | Status: SHIPPED | OUTPATIENT
Start: 2023-08-31 | End: 2023-09-07

## 2023-08-31 ASSESSMENT — ENCOUNTER SYMPTOMS
FLANK PAIN: 0
CHILLS: 0
FEVER: 0

## 2023-08-31 NOTE — PROGRESS NOTES
"Subjective:     Kelly Hurley is a 77 y.o. female who presents for Follow-Up (X 2 days ago on Urine Cx. )      TONIE Allen is a pleasant 77-year-old female who presents to urgent care today for a follow-up visit after being seen in the clinic 3 days ago for dysuria and vaginal irritation.  Vaginal pathogen culture was negative for yeast and BV.  Symptoms have recently worsened with more severe pain with urination.  Patient has not started antibiotic that was previously prescribed.  Negative for fever, chills, nausea or vomiting.  She does continue to have vaginal irritation and itching.  She has been using badges still with little to no relief.  She does note a history of vaginal dryness.   Review of Systems   Constitutional:  Negative for chills and fever.   Genitourinary:  Positive for dysuria, frequency and urgency. Negative for flank pain and hematuria.       PMH: No past medical history on file.  ALLERGIES:   Allergies   Allergen Reactions    Mixed Grasses Hives, Itching and Cough    Banana      eczema    Eggs      eczema     SURGHX: No past surgical history on file.  SOCHX:   Social History     Socioeconomic History    Marital status:    Tobacco Use    Smoking status: Never    Smokeless tobacco: Never   Vaping Use    Vaping Use: Never used   Substance and Sexual Activity    Alcohol use: Yes     Alcohol/week: 6.0 oz     Types: 10 Glasses of wine per week    Drug use: Never     FH:   Family History   Problem Relation Age of Onset    Heart Attack Mother     Heart Attack Sister     Heart Attack Brother          Objective:   /74   Pulse (!) 101   Temp 36.7 °C (98 °F) (Temporal)   Resp 16   Ht 1.6 m (5' 3\")   Wt 61.2 kg (135 lb)   SpO2 97%   BMI 23.91 kg/m²     Physical Exam  Vitals and nursing note reviewed.   Constitutional:       General: She is not in acute distress.     Appearance: Normal appearance. She is not ill-appearing.   HENT:      Head: Normocephalic and atraumatic.      Right " Ear: External ear normal.      Left Ear: External ear normal.      Nose: No congestion or rhinorrhea.      Mouth/Throat:      Mouth: Mucous membranes are moist.   Eyes:      Extraocular Movements: Extraocular movements intact.      Pupils: Pupils are equal, round, and reactive to light.   Cardiovascular:      Rate and Rhythm: Normal rate and regular rhythm.      Pulses: Normal pulses.      Heart sounds: Normal heart sounds.   Pulmonary:      Effort: Pulmonary effort is normal.      Breath sounds: Normal breath sounds.   Abdominal:      General: Abdomen is flat. Bowel sounds are normal.      Palpations: Abdomen is soft.      Tenderness: There is no abdominal tenderness. There is no right CVA tenderness or left CVA tenderness.   Musculoskeletal:         General: Normal range of motion.      Cervical back: Normal range of motion and neck supple.   Skin:     General: Skin is warm and dry.      Capillary Refill: Capillary refill takes less than 2 seconds.   Neurological:      General: No focal deficit present.      Mental Status: She is alert and oriented to person, place, and time. Mental status is at baseline.   Psychiatric:         Mood and Affect: Mood normal.         Behavior: Behavior normal.         Thought Content: Thought content normal.         Judgment: Judgment normal.         Assessment/Plan:   Assessment    1. Dysuria  phenazopyridine (PYRIDIUM) 200 MG Tab      2. Vaginal irritation  estradiol (ESTRACE) 0.1 MG/GM vaginal cream    Referral to Gynecology      3. Acute cystitis with hematuria  sulfamethoxazole-trimethoprim (BACTRIM DS) 800-160 MG tablet        Bactrim sent to pharmacy for treatment of UTI.  It was originally thought that her symptoms were due to vaginal infection however, this is negative and symptoms are worsening.  Patient may use Pyridium for relief of discomfort.  I am also concerned for vaginal atrophy due to vaginal irritation and itching.  Estradiol cream ordered for patient to use for  symptomatic relief.  I did refer her to follow-up with gynecology.  Urine culture is in process.

## 2023-09-01 DIAGNOSIS — N30.01 ACUTE CYSTITIS WITH HEMATURIA: ICD-10-CM

## 2023-09-01 DIAGNOSIS — A49.02 MRSA INFECTION (METHICILLIN-RESISTANT STAPHYLOCOCCUS AUREUS): ICD-10-CM

## 2023-09-01 RX ORDER — NITROFURANTOIN 25; 75 MG/1; MG/1
100 CAPSULE ORAL 2 TIMES DAILY
Qty: 14 CAPSULE | Refills: 0 | Status: SHIPPED | OUTPATIENT
Start: 2023-09-01 | End: 2023-09-08